# Patient Record
Sex: MALE | Race: WHITE
[De-identification: names, ages, dates, MRNs, and addresses within clinical notes are randomized per-mention and may not be internally consistent; named-entity substitution may affect disease eponyms.]

---

## 2019-01-11 ENCOUNTER — HOSPITAL ENCOUNTER (EMERGENCY)
Dept: HOSPITAL 43 - DL.ED | Age: 34
Discharge: HOME | End: 2019-01-11
Payer: COMMERCIAL

## 2019-01-11 DIAGNOSIS — R09.1: ICD-10-CM

## 2019-01-11 DIAGNOSIS — R07.89: Primary | ICD-10-CM

## 2019-01-11 DIAGNOSIS — F17.210: ICD-10-CM

## 2019-01-11 DIAGNOSIS — I48.91: ICD-10-CM

## 2019-01-11 DIAGNOSIS — Z88.0: ICD-10-CM

## 2019-01-11 LAB
ANION GAP SERPL CALC-SCNC: 13.3 MMOL/L
CHLORIDE SERPL-SCNC: 103 MMOL/L (ref 101–111)
SODIUM SERPL-SCNC: 136 MMOL/L (ref 135–145)

## 2019-01-11 NOTE — CR
Medical history: 33-year-old male chest pain.

 

Interpretation: Negative.

 

Upright AP portable chest film reveals normal cardiac silhouette and pulmonary

vascularity. No alveolar edema or dependent effusion. (External cardiac monitor

leads).

 

No lung mass, hilar lymphadenopathy or focal lobar pneumonia.

 

No atelectasis/collapse.

 

No pneumothorax or free subdiaphragmatic air.

## 2019-01-11 NOTE — EDM.PDOC
Scribed by Poppy Miller 01/11/19 1842 for Sean Dinh MD





ED HPI GENERAL MEDICAL PROBLEM





- General


Chief Complaint: Chest Pain


Stated Complaint: CHEST PAIN THAT GO INTO ARM [id number]


Time Seen by Provider: 01/11/19 18:06


Source of Information: Reports: Patient, RN, RN Notes Reviewed


History Limitations: Reports: No Limitations





- History of Present Illness


INITIAL COMMENTS - FREE TEXT/NARRATIVE: 


Patient presents to ER by POV with complaint of onset of sharp chest pain at 

2p.m. Patient states he has had a cough with sputum  production for 2 months. 

He describes the pain as sharp that radiates to the left shoulder. It is worse 

with cough, deep breathing and movement. Denies fever, chills, edema, 

palpitations or orthopnea. He has a history of atrial fibrillation. 


Onset: Today


Duration: Constant


Location: Reports: Chest


Quality: Reports: Sharp


Severity: Severe


Improves with: Reports: None


Worsens with: Reports: None


Associated Symptoms: Reports: No Other Symptoms


  ** Chest


Pain Score (Numeric/FACES): 8





- Related Data


 Allergies











Allergy/AdvReac Type Severity Reaction Status Date / Time


 


Penicillins Allergy  Cannot Verified 01/11/19 18:07





   Remember  











Home Meds: 


 Home Meds





. [No Known Home Meds]  01/11/19 [History]











Past Medical History


Cardiovascular History: Reports: Afib





Social & Family History





- Family History


Family Medical History: Noncontributory





- Tobacco Use


Smoking Status *Q: Current Every Day Smoker


Tobacco Use Within Last Twelve Months: Cigarettes





- Living Situation & Occupation


Living situation: Reports: 


Occupation: Employed





ED ROS GENERAL





- Review of Systems


Review Of Systems: ROS reveals no pertinent complaints other than HPI.





ED EXAM, GENERAL





- Physical Exam


Exam: See Below


Exam Limited By: No Limitations


General Appearance: Alert, WD/WN, No Apparent Distress


Eye Exam: Bilateral Eye: EOMI, Normal Inspection, PERRL


Ears: Normal External Exam, Normal Canal, Hearing Grossly Normal, Normal TMs


Nose: Normal Inspection, Normal Mucosa, No Blood


Throat/Mouth: Normal Inspection, Normal Lips, Normal Teeth, Normal Gums, Normal 

Oropharynx, Normal Voice, No Airway Compromise


Neck: Normal Inspection, Supple, Non-Tender, Full Range of Motion


Respiratory/Chest: No Respiratory Distress, No Accessory Muscle Use, Decreased 

Breath Sounds, Other (Chest pain is reproducible by firm palpation).  No: Rales

, Rhonchi, Wheezing


Cardiovascular: Normal Peripheral Pulses, Regular Rate, Rhythm, No Edema, No 

Gallop, No JVD, No Murmur, No Rub


GI/Abdominal: Normal Bowel Sounds, Soft, Non-Tender, No Organomegaly, No 

Distention, No Abnormal Bruit, No Mass


 (Male) Exam: Deferred


Rectal (Males) Exam: Deferred


Back Exam: Normal Inspection, Full Range of Motion, NT


Extremities: Normal Inspection, Normal Range of Motion, Non-Tender, Normal 

Capillary Refill, No Pedal Edema


Neurological: Alert, Oriented, CN II-XII Intact, Normal Cognition, Normal Gait, 

Normal Reflexes, No Motor/Sensory Deficits


Psychiatric: Normal Affect, Normal Mood


Skin Exam: Warm, Dry, Intact, Normal Color, No Rash





EKG INTERPRETATION


EKG Date: 01/11/19


Time: 18:10


Rhythm: Other (Sinus tachycardia)


Rate (Beats/Min): 104


Axis: Normal


P-Wave: Present


QRS: Normal


ST-T: Normal


QT: Normal


Comparison: NA - No Prior EKG





Course





- Vital Signs


Last Recorded V/S: 


 Last Vital Signs











Temp  37.3 C   01/11/19 18:08


 


Pulse  100   01/11/19 18:08


 


Resp  18   01/11/19 18:08


 


BP  124/84   01/11/19 18:08


 


Pulse Ox  98   01/11/19 18:08














- Orders/Labs/Meds


Orders: 


 Active Orders 24 hr











 Category Date Time Status


 


 EKG 12 Lead [EKG Documentation Completion] [RC] STAT Care  01/11/19 18:17 

Active


 


 Peripheral IV Care [RC] .AS DIRECTED Care  01/11/19 18:21 Active


 


 DRUG SCREEN URINE BIORAD [URCHEM] Stat Lab  01/11/19 18:21 Ordered


 


 UA RFX MALCOLM AND CULT IF INDIC [URIN] Stat Lab  01/11/19 18:21 Ordered


 


 Sodium Chloride 0.9% [Saline Flush] Med  01/11/19 18:17 Active





 10 ml FLUSH ASDIRECTED PRN   


 


 Peripheral IV Insertion Adult [OM.PC] Stat Oth  01/11/19 18:17 Ordered








 Medication Orders





Sodium Chloride (Saline Flush)  10 ml FLUSH ASDIRECTED PRN


   PRN Reason: Keep Vein Open


   Last Admin: 01/11/19 18:30  Dose: 10 ml








Labs: 


 Laboratory Tests











  01/11/19 01/11/19 01/11/19 Range/Units





  18:10 18:10 18:10 


 


WBC  8.6    (5.0-10.0)  10^3/uL


 


RBC  5.42    (4.6-6.2)  10^6/uL


 


Hgb  16.3    (14.0-18.0)  g/dL


 


Hct  44.7    (40.0-54.0)  %


 


MCV  82.5    ()  fL


 


MCH  30.1    (27.0-34.0)  pg


 


MCHC  36.5 H    (33.0-35.0)  g/dL


 


Plt Count  158    (150-450)  10^3/uL


 


Neut % (Auto)  68.5    (42.2-75.2)  %


 


Lymph % (Auto)  22.2    (20.5-50.1)  %


 


Mono % (Auto)  6.7    (2-8)  %


 


Eos % (Auto)  2.4    (1.0-3.0)  %


 


Baso % (Auto)  0.2    (0.0-1.0)  %


 


D-Dimer, Quantitative    < 100  (0-400)  ng/mL


 


Sodium   136   (135-145)  mmol/L


 


Potassium   3.3 L   (3.6-5.0)  mmol/L


 


Chloride   103   (101-111)  mmol/L


 


Carbon Dioxide   23.0   (21.0-31.0)  mmol/L


 


Anion Gap   13.3   


 


BUN   14   (7-18)  mg/dL


 


Creatinine   0.9   (0.6-1.3)  mg/dL


 


Est Cr Clr Drug Dosing   108.13   mL/min


 


Estimated GFR (MDRD)   > 60   


 


BUN/Creatinine Ratio   15.55   


 


Glucose   97   ()  mg/dL


 


Calcium   8.7   (8.4-10.2)  mg/dl


 


Total Bilirubin   1.4 H   (0.2-1.0)  mg/dL


 


AST   27   (10-42)  IU/L


 


ALT   18   (10-60)  IU/L


 


Alkaline Phosphatase   64   ()  IU/L


 


Troponin I   < 0.02   (0.00-0.02)  ng/ml


 


Total Protein   7.2   (6.7-8.2)  g/dl


 


Albumin   4.2   (3.2-5.5)  g/dl


 


Globulin   3.0   


 


Albumin/Globulin Ratio   1.40   


 


Lipase   24   (22-51)  U/L











Meds: 


Medications











Generic Name Dose Route Start Last Admin





  Trade Name Elisha  PRN Reason Stop Dose Admin


 


Sodium Chloride  10 ml  01/11/19 18:17  01/11/19 18:30





  Saline Flush  FLUSH   10 ml





  ASDIRECTED PRN   Administration





  Keep Vein Open   





     





     





     














Discontinued Medications














Generic Name Dose Route Start Last Admin





  Trade Name Elisha  PRN Reason Stop Dose Admin


 


Acetaminophen/Codeine Phosphate  1 tab  01/11/19 18:58  





  Tylenol With Codeine No.3 300mg/30mg  PO  01/11/19 18:59  





  ONETIME ONE   





     





     





     





     


 


Aspirin  324 mg  01/11/19 18:16  01/11/19 18:30





  Aspirin  PO  01/11/19 18:17  324 mg





  ONETIME ONE   Administration





     





     





     





     


 


Prednisone  60 mg  01/11/19 18:57  





  Prednisone  PO  01/11/19 18:58  





  ONETIME ONE   





     





     





     





     














- Radiology Interpretation


Free Text/Narrative:: 


Chest XR: no acute process, see Rad. report.








Departure





- Departure


Time of Disposition: 19:00


Disposition: Home, Self-Care 01


Condition: Good


Clinical Impression: 


 Non-cardiac chest pain, Pleurisy without effusion





Instructions:  Pleurisy, Nonspecific Chest Pain, Easy-to-Read


Forms:  ED Department Discharge


Additional Instructions: 


Rx: Prednisone 20mg  *Take with food.


Try to quit smoking.


Follow up in clinic next week if not improved.





- My Orders


Last 24 Hours: 


My Active Orders





01/11/19 18:17


EKG 12 Lead [EKG Documentation Completion] [RC] STAT 


Sodium Chloride 0.9% [Saline Flush]   10 ml FLUSH ASDIRECTED PRN 


Peripheral IV Insertion Adult [OM.PC] Stat 





01/11/19 18:21


Peripheral IV Care [RC] .AS DIRECTED 


DRUG SCREEN URINE BIORAD [URCHEM] Stat 


UA RFX MALCOLM AND CULT IF INDIC [URIN] Stat 














- Assessment/Plan


Last 24 Hours: 


My Active Orders





01/11/19 18:17


EKG 12 Lead [EKG Documentation Completion] [RC] STAT 


Sodium Chloride 0.9% [Saline Flush]   10 ml FLUSH ASDIRECTED PRN 


Peripheral IV Insertion Adult [OM.PC] Stat 





01/11/19 18:21


Peripheral IV Care [RC] .AS DIRECTED 


DRUG SCREEN URINE BIORAD [URCHEM] Stat 


UA RFX MALCOLM AND CULT IF INDIC [URIN] Stat 














I have read and agree with the documentation that has been completed regarding 

this visit. By signing this record, I attest that the documentation was 

completed in my physical presence and is an accurate record of the encounter.

## 2019-02-24 ENCOUNTER — HOSPITAL ENCOUNTER (EMERGENCY)
Dept: HOSPITAL 43 - DL.ED | Age: 34
Discharge: HOME | End: 2019-02-24
Payer: COMMERCIAL

## 2019-02-24 DIAGNOSIS — R11.2: ICD-10-CM

## 2019-02-24 DIAGNOSIS — Z88.0: ICD-10-CM

## 2019-02-24 DIAGNOSIS — R10.9: ICD-10-CM

## 2019-02-24 DIAGNOSIS — K21.9: ICD-10-CM

## 2019-02-24 DIAGNOSIS — F17.210: ICD-10-CM

## 2019-02-24 DIAGNOSIS — R05: ICD-10-CM

## 2019-02-24 DIAGNOSIS — T52.0X1A: Primary | ICD-10-CM

## 2019-02-24 DIAGNOSIS — Z79.899: ICD-10-CM

## 2019-02-24 PROCEDURE — 71046 X-RAY EXAM CHEST 2 VIEWS: CPT

## 2019-02-24 PROCEDURE — 96372 THER/PROPH/DIAG INJ SC/IM: CPT

## 2019-02-24 PROCEDURE — 94640 AIRWAY INHALATION TREATMENT: CPT

## 2019-02-24 PROCEDURE — 99284 EMERGENCY DEPT VISIT MOD MDM: CPT
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
